# Patient Record
Sex: FEMALE | Race: BLACK OR AFRICAN AMERICAN | Employment: FULL TIME | URBAN - METROPOLITAN AREA
[De-identification: names, ages, dates, MRNs, and addresses within clinical notes are randomized per-mention and may not be internally consistent; named-entity substitution may affect disease eponyms.]

---

## 2017-04-02 ENCOUNTER — APPOINTMENT (OUTPATIENT)
Dept: GENERAL RADIOLOGY | Age: 27
End: 2017-04-02
Attending: PHYSICIAN ASSISTANT
Payer: OTHER GOVERNMENT

## 2017-04-02 ENCOUNTER — HOSPITAL ENCOUNTER (EMERGENCY)
Age: 27
Discharge: HOME | End: 2017-04-02
Attending: INTERNAL MEDICINE | Admitting: INTERNAL MEDICINE
Payer: OTHER GOVERNMENT

## 2017-04-02 VITALS
DIASTOLIC BLOOD PRESSURE: 65 MMHG | BODY MASS INDEX: 21.66 KG/M2 | SYSTOLIC BLOOD PRESSURE: 118 MMHG | WEIGHT: 130 LBS | HEART RATE: 76 BPM | TEMPERATURE: 97.8 F | OXYGEN SATURATION: 100 % | HEIGHT: 65 IN | RESPIRATION RATE: 20 BRPM

## 2017-04-02 DIAGNOSIS — M53.3 COCCYDYNIA: ICD-10-CM

## 2017-04-02 DIAGNOSIS — S66.912A WRIST STRAIN, LEFT, INITIAL ENCOUNTER: Primary | ICD-10-CM

## 2017-04-02 PROCEDURE — 73110 X-RAY EXAM OF WRIST: CPT

## 2017-04-02 PROCEDURE — 99282 EMERGENCY DEPT VISIT SF MDM: CPT

## 2017-04-02 RX ORDER — TRAMADOL HYDROCHLORIDE 50 MG/1
50 TABLET ORAL
Qty: 15 TAB | Refills: 0 | Status: SHIPPED | OUTPATIENT
Start: 2017-04-02

## 2017-04-02 NOTE — DISCHARGE INSTRUCTIONS
Coccyx Pain: Care Instructions  Your Care Instructions  The coccyx is your tailbone. You can have pain in your tailbone from a fall or other injury. Pregnancy and childbirth also can cause tailbone pain. Sometimes, the cause of pain is not known. A tailbone injury causes pain when you sit, especially when you slump or sit on a hard seat. Straining to have a bowel movement also can be very painful. Tailbone injuries can take several months to heal, but in some cases the pain goes even longer. You can take steps at home to ease the pain. In some cases, a doctor injects a corticosteroid medicine into the coccyx to reduce swelling and pain. Follow-up care is a key part of your treatment and safety. Be sure to make and go to all appointments, and call your doctor if you are having problems. It's also a good idea to know your test results and keep a list of the medicines you take. How can you care for yourself at home? · Take pain medicines exactly as directed. ¨ If the doctor gave you a prescription medicine for pain, take it as prescribed. ¨ If you are not taking a prescription pain medicine, take an over-the-counter medicine to reduce pain. · Put ice or a cold pack on your tailbone for 10 to 20 minutes at a time. Try to do this every 1 to 2 hours for the next 3 days (when you are awake) or until the swelling goes down. Put a thin cloth between the ice and your skin. · About 2 or 3 days after your injury, you can alternate ice and heat. To soothe the tailbone area, take a warm bath for 20 minutes, 3 or 4 times a day. · Sit on soft, padded surfaces. A doughnut-shaped pillow can take pressure off the tailbone. · Avoid constipation, because straining to have a bowel movement will increase your tailbone pain. ¨ Include fruits, vegetables, beans, and whole grains in your diet each day. These foods are high in fiber. ¨ Drink plenty of fluids, enough so that your urine is light yellow or clear like water.  If you have kidney, heart, or liver disease and have to limit fluids, talk with your doctor before you increase the amount of fluids you drink. ¨ Get some exercise every day. Build up slowly to 30 to 60 minutes a day on 5 or more days of the week. ¨ Take a fiber supplement, such as Citrucel or Metamucil, every day if needed. Read and follow all instructions on the label. ¨ Schedule time each day for a bowel movement. A daily routine may help. Take your time and do not strain when having a bowel movement. · Follow your doctor's directions for stretching and other exercises that might help with pain. When should you call for help? Call 911 anytime you think you may need emergency care. For example, call if:  · You are unable to move a leg at all. Call your doctor now or seek immediate medical care if:  · You have new or worse symptoms in your legs or buttocks. Symptoms may include:  ¨ Numbness or tingling. ¨ Weakness. ¨ Pain. · You lose bladder or bowel control. Watch closely for changes in your health, and be sure to contact your doctor if:  · You are not getting better as expected. Where can you learn more? Go to http://kylee-anjel.info/. Enter L007 in the search box to learn more about \"Coccyx Pain: Care Instructions. \"  Current as of: May 27, 2016  Content Version: 11.2  © 9935-1731 [employer]. Care instructions adapted under license by [employer] (which disclaims liability or warranty for this information). If you have questions about a medical condition or this instruction, always ask your healthcare professional. [place] [address] any warranty or liability for your use of this information.

## 2017-04-02 NOTE — ED PROVIDER NOTES
HPI Comments:   9:54 AM  Miguel Alba is a 28 y.o. female presenting to the ED C/O left wrist pain onset last night after fall on outstretched hand. Pt reports she was playing kickball and collided into someone and flew back landing on her tailbone and her hands. Pt applied ice for relief. Pt is left handed. Pt denies any other symptoms or complaints. Written by ZEFERINO Fortune, as dictated by Dom Harper PA-C     Patient is a 28 y.o. female presenting with wrist pain and coccyx pain. The history is provided by the patient. No  was used. Wrist Pain    This is a new problem. The current episode started yesterday. The problem occurs constantly. The problem has not changed since onset. The pain is present in the left wrist. Associated symptoms include back pain. Tailbone Pain           History reviewed. No pertinent past medical history. History reviewed. No pertinent surgical history. History reviewed. No pertinent family history. Social History     Social History    Marital status: SINGLE     Spouse name: N/A    Number of children: N/A    Years of education: N/A     Occupational History    Not on file. Social History Main Topics    Smoking status: Never Smoker    Smokeless tobacco: Not on file    Alcohol use Yes      Comment: rarely    Drug use: No    Sexual activity: Not on file     Other Topics Concern    Not on file     Social History Narrative         ALLERGIES: Review of patient's allergies indicates no known allergies. Review of Systems   Musculoskeletal: Positive for arthralgias and back pain. All other systems reviewed and are negative. Vitals:    04/02/17 0928   BP: 118/65   Pulse: 76   Resp: 20   Temp: 97.8 °F (36.6 °C)   SpO2: 100%   Weight: 59 kg (130 lb)   Height: 5' 5\" (1.651 m)            Physical Exam   Constitutional: She is oriented to person, place, and time. She appears well-developed and well-nourished. No distress. HENT:   Head: Normocephalic and atraumatic. Eyes: Conjunctivae and EOM are normal. Pupils are equal, round, and reactive to light. Neck: Normal range of motion. Neck supple. Musculoskeletal: Normal range of motion. She exhibits no edema or deformity. Left elbow: Normal.        Left wrist: She exhibits tenderness (diffuse). She exhibits normal range of motion, no bony tenderness, no swelling, no effusion, no crepitus, no deformity and no laceration. Right hip: Normal.        Left hip: Normal.        Cervical back: Normal.        Thoracic back: Normal.        Lumbar back: She exhibits tenderness and pain. She exhibits normal range of motion, no bony tenderness, no swelling, no edema and no deformity. Back:         Left hand: Normal.   Neurological: She is alert and oriented to person, place, and time. She has normal strength. Gait normal.   Skin: Skin is warm and dry. Abrasion noted. No bruising, no ecchymosis and no laceration noted. Psychiatric: She has a normal mood and affect. Her behavior is normal.   Nursing note and vitals reviewed. RESULTS:    9:57 AM  RADIOLOGY FINDINGS  Left wrist X-ray shows NAP  Pending review by Radiologist  Recorded by Julissa Mattson ED Scribe, as dictated by Noel Mendiola PA-C    XR WRIST LT AP/LAT/OBL MIN 3V    (Results Pending)        Labs Reviewed - No data to display    No results found for this or any previous visit (from the past 12 hour(s)). MDM  Number of Diagnoses or Management Options     Amount and/or Complexity of Data Reviewed  Tests in the radiology section of CPT®: ordered and reviewed (Left wrist x-ray)  Independent visualization of images, tracings, or specimens: yes (Left wrist x-ray)      ED Course     MEDICATIONS GIVEN:  Medications - No data to display     Procedures    PROGRESS NOTE:  9:54 PM  Initial assessment performed.   Written by Julissa Mattson ED Scribe, as dictated by Noel Mendiola PA-C    DISCHARGE NOTE:  9:58 PRISCILA Quintanilla's  results have been reviewed with her. She has been counseled regarding her diagnosis, treatment, and plan. She verbally conveys understanding and agreement of the signs, symptoms, diagnosis, treatment and prognosis and additionally agrees to follow up as discussed. She also agrees with the care-plan and conveys that all of her questions have been answered. I have also provided discharge instructions for her that include: educational information regarding their diagnosis and treatment, and list of reasons why they would want to return to the ED prior to their follow-up appointment, should her condition change. The patient and/or family has been provided with education for proper Emergency Department utilization. CLINICAL IMPRESSION:    1. Wrist strain, left, initial encounter    2. Coccydynia        PLAN: DISCHARGE HOME    Follow-up Information     Follow up With Details Comments Contact Info    JONELLE Schedule an appointment as soon as possible for a visit in 2 days  [phone]    [facility] EMERGENCY DEPT  As needed, If symptoms worsen [address] [place] [address] [phone]          Current Discharge Medication List      START taking these medications    Details   traMADol (ULTRAM) 50 mg tablet Take 1 Tab by mouth every six (6) hours as needed for Pain. Max Daily Amount: 200 mg. Qty: 15 Tab, Refills: 0             ATTESTATIONS:  This note is prepared by Marta Vaughan, acting as Scribe for Sherwin Schultz PA-C . Sherwin Schultz PA-C: The scribe's documentation has been prepared under my direction and personally reviewed by me in its entirety. I confirm that the note above accurately reflects all work, treatment, procedures, and medical decision making performed by me.

## 2017-04-02 NOTE — ED TRIAGE NOTES
Patient states she collided with another player in [employer] and fell back on [facility].  Patient with complaints of left wrist pain and tailbone pain

## 2017-04-02 NOTE — ED NOTES
Patient armband removed and shredded. Pt given note for PT, script x1 with discharge instructions and verbalizes understanding. No distress noted on discharge. See scanned documents for pt rec'ing d/c instructions. Ambulatory without difficulty.

## 2017-04-02 NOTE — LETTER
[facility] [place] 
[facility] EMERGENCY DEPT 
[address]  [id number] 
[phone] Work/School Note Date: 4/2/2017 To Whom It May concern: 
 
Aby Fernández was seen and treated today in the emergency room by the following provider(s): 
Attending Provider: Mandy Roe MD 
Physician Assistant: MARIELA Grant. Pete Quintanilla should be exempt from any physical training until 4/5/17 Sincerely, 
 
 
 
Roni Suarez PA-C